# Patient Record
Sex: FEMALE | Race: WHITE | NOT HISPANIC OR LATINO | URBAN - METROPOLITAN AREA
[De-identification: names, ages, dates, MRNs, and addresses within clinical notes are randomized per-mention and may not be internally consistent; named-entity substitution may affect disease eponyms.]

---

## 2020-06-03 ENCOUNTER — EMERGENCY (EMERGENCY)
Facility: HOSPITAL | Age: 30
LOS: 1 days | Discharge: HOME | End: 2020-06-03
Attending: EMERGENCY MEDICINE | Admitting: EMERGENCY MEDICINE
Payer: COMMERCIAL

## 2020-06-03 VITALS
RESPIRATION RATE: 16 BRPM | WEIGHT: 250 LBS | SYSTOLIC BLOOD PRESSURE: 133 MMHG | DIASTOLIC BLOOD PRESSURE: 85 MMHG | HEART RATE: 118 BPM | HEIGHT: 66 IN | TEMPERATURE: 98 F | OXYGEN SATURATION: 97 %

## 2020-06-03 VITALS — OXYGEN SATURATION: 98 % | HEART RATE: 94 BPM | RESPIRATION RATE: 16 BRPM

## 2020-06-03 DIAGNOSIS — K08.409 PARTIAL LOSS OF TEETH, UNSPECIFIED CAUSE, UNSPECIFIED CLASS: Chronic | ICD-10-CM

## 2020-06-03 PROCEDURE — 99282 EMERGENCY DEPT VISIT SF MDM: CPT

## 2020-06-03 RX ORDER — DOCUSATE SODIUM 100 MG
1 CAPSULE ORAL
Qty: 60 | Refills: 0
Start: 2020-06-03 | End: 2020-07-02

## 2020-06-03 NOTE — ED PROVIDER NOTE - CLINICAL SUMMARY MEDICAL DECISION MAKING FREE TEXT BOX
healthy young F with rectal bleeding with BM x 2 . no s/s of significant anemia or hemorrhage.  no obvious lesion on exam.  likely internal hemorrhoid.  f/u GI. rx colace

## 2020-06-03 NOTE — ED PROVIDER NOTE - GASTROINTESTINAL, MLM
Abdomen soft, non-tender, no guarding.  rectal exam: anus without external lesions, normal. no rectal mass, tenderness or gross blood.  chaperoned by YULIET Velarde

## 2020-06-03 NOTE — ED ADULT NURSE NOTE - OBJECTIVE STATEMENT
Patient with no pertinent medical history, on birth control, ambulatory from triage with steady gait with c/o two episodes of rectal bleeding. Patient with no pertinent medical history, on birth control, ambulatory from triage with steady gait with c/o two episodes of rectal bleeding.  Patient denies any other symptoms.  No fevers, chills or abdominal pain.

## 2020-06-03 NOTE — ED PROVIDER NOTE - OBJECTIVE STATEMENT
pt c/o bright red blood per rectum, moderate, last night with BM and also again this morning.  BM are normal color and form.  no bleeding except c BM.  no rectal or abd pain. no rectal trauma. no fever/chills. no other irregular bleeding or bruising. no chest pain, sob, dizziness. pt states she tends to have more hard stools and does strain c BM

## 2020-06-03 NOTE — ED PROVIDER NOTE - PATIENT PORTAL LINK FT
You can access the FollowMyHealth Patient Portal offered by [facility] by registering at the following website: [email or web address]. By joining [facility]’s FollowMyHealth portal, you will also be able to view your health information using other applications (apps) compatible with our system.

## 2020-06-03 NOTE — ED ADULT NURSE NOTE - CHPI ED NUR SYMPTOMS NEG
no pain/no chills/no vomiting/no nausea/no weakness/no dizziness/no decreased eating/drinking/no fever/no tingling

## 2020-06-03 NOTE — ED PROVIDER NOTE - NSFOLLOWUPINSTRUCTIONS_ED_ALL_ED_FT
-drink plenty of fluids. increase fiber in your diet  -take colace daily (stool softener)  -follow up with GI doctor this week    Rectal Bleeding    WHAT YOU NEED TO KNOW:    Rectal bleeding can be caused by constipation, hemorrhoids, or anal fissures. It may also be caused by polyps, tumors, or medical conditions, such as colitis or diverticulitis.    DISCHARGE INSTRUCTIONS:    Medicines:     Pain medicine: You may be given medicine to take away or decrease pain. Do not wait until the pain is severe before you take your medicine.      Iron supplement: Iron helps your body make more red blood cells.       Steroids: This medicine decreases inflammation in your rectum. It may be applied as a cream, ointment, or lotion.      Take your medicine as directed. Contact your healthcare provider if you think your medicine is not helping or if you have side effects. Tell him of her if you are allergic to any medicine. Keep a list of the medicines, vitamins, and herbs you take. Include the amounts, and when and why you take them. Bring the list or the pill bottles to follow-up visits. Carry your medicine list with you in case of an emergency.    Follow up with your healthcare provider as directed: Write down your questions so you remember to ask them during your visits.     Drink liquids as directed: Ask your healthcare provider how much liquid to drink each day and which liquids are best for you. This will help prevent dehydration and constipation.    Contact your healthcare provider if:     You have a fever.      Your rectal bleeding stopped for a time, but has started again.       You have nausea.      You have cold, sweaty, pale skin.      You have changes in your bowel movements, such as diarrhea.      You have questions or concerns about your condition or care.    Return to the emergency department if:     You are breathing faster than usual.      You are dizzy, lightheaded, or feel faint.      You are confused or cannot think clearly.      You urinate less than usual or not at all.      Your rectal bleeding is constant or heavy.      You have severe abdominal pain or cramping.

## 2020-06-03 NOTE — ED PROVIDER NOTE - CARE PROVIDER_API CALL
GIO BRENNAN  GASTROENTEROLOGY  [address] [place] [address]  Phone: [phone]  Fax: [phone]  Follow Up Time: 1-3 Days

## 2020-06-03 NOTE — ED ADULT NURSE NOTE - NSIMPLEMENTINTERV_GEN_ALL_ED
Implemented All Universal Safety Interventions:  [place] to call system. Call bell, personal items and telephone within reach. Instruct patient to call for assistance. Room bathroom lighting operational. Non-slip footwear when patient is off stretcher. Physically safe environment: no spills, clutter or unnecessary equipment. Stretcher in lowest position, wheels locked, appropriate side rails in place.